# Patient Record
Sex: MALE | Race: WHITE
[De-identification: names, ages, dates, MRNs, and addresses within clinical notes are randomized per-mention and may not be internally consistent; named-entity substitution may affect disease eponyms.]

---

## 2018-06-14 ENCOUNTER — HOSPITAL ENCOUNTER (OUTPATIENT)
Dept: HOSPITAL 4 - SLB | Age: 38
Discharge: HOME | End: 2018-06-14
Attending: NEUROLOGICAL SURGERY
Payer: COMMERCIAL

## 2018-06-14 DIAGNOSIS — Z79.01: ICD-10-CM

## 2018-06-14 DIAGNOSIS — E10.649: Primary | ICD-10-CM

## 2018-06-14 LAB
ANION GAP SERPL CALCULATED.3IONS-SCNC: 7 MMOL/L (ref 5–15)
BASOPHILS # BLD AUTO: 0.1 K/UL (ref 0–0.2)
BASOPHILS NFR BLD AUTO: 1 % (ref 0–2)
BUN SERPL-MCNC: 9 MG/DL (ref 8–21)
CALCIUM SERPL-MCNC: 9.2 MG/DL (ref 8.4–11)
CHLORIDE SERPL-SCNC: 102 MMOL/L (ref 98–107)
CREAT SERPL-MCNC: 0.71 MG/DL (ref 0.55–1.3)
EOSINOPHIL # BLD AUTO: 0.4 K/UL (ref 0–0.4)
EOSINOPHIL NFR BLD AUTO: 4.3 % (ref 0–4)
ERYTHROCYTE [DISTWIDTH] IN BLOOD BY AUTOMATED COUNT: 13.4 % (ref 9–15)
GFR SERPL CREATININE-BSD FRML MDRD: 160 ML/MIN (ref 90–?)
GLUCOSE SERPL-MCNC: 146 MG/DL (ref 70–99)
HCT VFR BLD AUTO: 45.4 % (ref 36–54)
HGB BLD-MCNC: 15.2 G/DL (ref 14–18)
INR PPP: 1 (ref 0.8–1.2)
LYMPHOCYTES # BLD AUTO: 2.2 K/UL (ref 1–5.5)
LYMPHOCYTES NFR BLD AUTO: 24.6 % (ref 20.5–51.5)
MCH RBC QN AUTO: 33 PG (ref 27–31)
MCHC RBC AUTO-ENTMCNC: 34 % (ref 32–36)
MCV RBC AUTO: 98 FL (ref 79–98)
MONOCYTES # BLD MANUAL: 0.6 K/UL (ref 0–1)
MONOCYTES # BLD MANUAL: 7 % (ref 1.7–9.3)
NEUTROPHILS # BLD AUTO: 5.6 K/UL (ref 1.8–7.7)
NEUTROPHILS NFR BLD AUTO: 63.1 % (ref 40–70)
PLATELET # BLD AUTO: 377 K/UL (ref 130–430)
POTASSIUM SERPL-SCNC: 4.5 MMOL/L (ref 3.5–5.1)
PROTHROMBIN TIME: 9.7 SECS (ref 9.5–12.5)
RBC # BLD AUTO: 4.64 MIL/UL (ref 4.2–6.2)
SODIUM SERPLBLD-SCNC: 136 MMOL/L (ref 136–145)
WBC # BLD AUTO: 8.9 K/UL (ref 4.8–10.8)

## 2018-09-05 ENCOUNTER — HOSPITAL ENCOUNTER (OUTPATIENT)
Dept: HOSPITAL 4 - SRD | Age: 38
Discharge: HOME | End: 2018-09-05
Attending: INTERNAL MEDICINE
Payer: COMMERCIAL

## 2018-09-05 DIAGNOSIS — M19.90: ICD-10-CM

## 2018-09-05 DIAGNOSIS — I10: ICD-10-CM

## 2018-09-05 DIAGNOSIS — E56.9: ICD-10-CM

## 2018-09-05 DIAGNOSIS — G99.0: ICD-10-CM

## 2018-09-05 DIAGNOSIS — F17.200: ICD-10-CM

## 2018-09-05 DIAGNOSIS — E87.5: Primary | ICD-10-CM

## 2018-09-05 LAB
ALBUMIN SERPL BCP-MCNC: 4 G/DL (ref 3.4–4.8)
ALT SERPL W P-5'-P-CCNC: 22 U/L (ref 12–78)
ANION GAP SERPL CALCULATED.3IONS-SCNC: 7 MMOL/L (ref 5–15)
AST SERPL W P-5'-P-CCNC: 9 U/L (ref 10–37)
BASOPHILS # BLD AUTO: 0.1 K/UL (ref 0–0.2)
BASOPHILS NFR BLD AUTO: 0.7 % (ref 0–2)
BILIRUB SERPL-MCNC: 0.6 MG/DL (ref 0–1)
BUN SERPL-MCNC: 6 MG/DL (ref 8–21)
CALCIUM SERPL-MCNC: 9.2 MG/DL (ref 8.4–11)
CHLORIDE SERPL-SCNC: 102 MMOL/L (ref 98–107)
CHOLEST SERPL-MCNC: 229 MG/DL (ref ?–200)
CREAT SERPL-MCNC: 0.67 MG/DL (ref 0.55–1.3)
EOSINOPHIL # BLD AUTO: 0.4 K/UL (ref 0–0.4)
EOSINOPHIL NFR BLD AUTO: 4 % (ref 0–4)
ERYTHROCYTE [DISTWIDTH] IN BLOOD BY AUTOMATED COUNT: 12.5 % (ref 9–15)
GFR SERPL CREATININE-BSD FRML MDRD: 171 ML/MIN (ref 90–?)
GLUCOSE SERPL-MCNC: 96 MG/DL (ref 70–99)
HCT VFR BLD AUTO: 45.5 % (ref 36–54)
HDLC SERPL-MCNC: 31 MG/DL (ref 45–?)
HGB BLD-MCNC: 15.5 G/DL (ref 14–18)
INR PPP: 0.9 (ref 0.8–1.2)
LDL CHOLESTEROL: 164 MG/DL (ref ?–100)
LYMPHOCYTES # BLD AUTO: 2.8 K/UL (ref 1–5.5)
LYMPHOCYTES NFR BLD AUTO: 28.7 % (ref 20.5–51.5)
MCH RBC QN AUTO: 32 PG (ref 27–31)
MCHC RBC AUTO-ENTMCNC: 34 % (ref 32–36)
MCV RBC AUTO: 93 FL (ref 79–98)
MONOCYTES # BLD MANUAL: 0.6 K/UL (ref 0–1)
MONOCYTES # BLD MANUAL: 6.7 % (ref 1.7–9.3)
NEUTROPHILS # BLD AUTO: 5.8 K/UL (ref 1.8–7.7)
NEUTROPHILS NFR BLD AUTO: 59.9 % (ref 40–70)
PLATELET # BLD AUTO: 296 K/UL (ref 130–430)
POTASSIUM SERPL-SCNC: 4.3 MMOL/L (ref 3.5–5.1)
PROTHROMBIN TIME: 9.5 SECS (ref 9.5–12.5)
RBC # BLD AUTO: 4.89 MIL/UL (ref 4.2–6.2)
SODIUM SERPLBLD-SCNC: 138 MMOL/L (ref 136–145)
TRIGL SERPL-MCNC: 203 MG/DL (ref 30–150)
TSH SERPL DL<=0.05 MIU/L-ACNC: 1.84 UIU/ML (ref 0.34–4.82)
WBC # BLD AUTO: 9.7 K/UL (ref 4.8–10.8)

## 2018-09-06 LAB
HBA1C MFR BLD: 5.3 % (ref 4.8–5.6)
VIT B12 SERPL-MCNC: 235 PG/ML (ref 232–1245)

## 2018-10-05 ENCOUNTER — HOSPITAL ENCOUNTER (OUTPATIENT)
Dept: HOSPITAL 4 - SRD | Age: 38
Discharge: HOME | End: 2018-10-05
Attending: NEUROLOGICAL SURGERY
Payer: COMMERCIAL

## 2018-10-05 DIAGNOSIS — Z96.698: ICD-10-CM

## 2018-10-05 DIAGNOSIS — M54.5: Primary | ICD-10-CM

## 2019-03-13 ENCOUNTER — HOSPITAL ENCOUNTER (OUTPATIENT)
Dept: HOSPITAL 4 - SCT | Age: 39
Discharge: HOME | End: 2019-03-13
Attending: INTERNAL MEDICINE
Payer: COMMERCIAL

## 2019-03-13 DIAGNOSIS — M25.571: Primary | ICD-10-CM

## 2019-03-13 DIAGNOSIS — M25.572: ICD-10-CM

## 2019-04-02 ENCOUNTER — HOSPITAL ENCOUNTER (OUTPATIENT)
Dept: HOSPITAL 4 - SRD | Age: 39
Discharge: HOME | End: 2019-04-02
Attending: INTERNAL MEDICINE
Payer: COMMERCIAL

## 2019-04-02 DIAGNOSIS — M25.572: Primary | ICD-10-CM

## 2019-10-07 ENCOUNTER — HOSPITAL ENCOUNTER (EMERGENCY)
Dept: HOSPITAL 4 - SED | Age: 39
Discharge: HOME | End: 2019-10-07
Payer: COMMERCIAL

## 2019-10-07 VITALS — SYSTOLIC BLOOD PRESSURE: 150 MMHG | BODY MASS INDEX: 28.68 KG/M2 | HEIGHT: 64 IN | WEIGHT: 168 LBS

## 2019-10-07 VITALS — SYSTOLIC BLOOD PRESSURE: 140 MMHG

## 2019-10-07 DIAGNOSIS — Z71.6: ICD-10-CM

## 2019-10-07 DIAGNOSIS — F10.129: Primary | ICD-10-CM

## 2019-10-07 DIAGNOSIS — Z79.899: ICD-10-CM

## 2019-10-07 DIAGNOSIS — I10: ICD-10-CM

## 2019-10-07 DIAGNOSIS — F17.200: ICD-10-CM

## 2019-11-12 ENCOUNTER — HOSPITAL ENCOUNTER (EMERGENCY)
Dept: HOSPITAL 4 - SED | Age: 39
Discharge: LEFT BEFORE DISCHARGE | End: 2019-11-12
Payer: COMMERCIAL

## 2019-11-12 VITALS — BODY MASS INDEX: 27.2 KG/M2 | WEIGHT: 190 LBS | HEIGHT: 70 IN

## 2019-11-12 VITALS — SYSTOLIC BLOOD PRESSURE: 154 MMHG

## 2019-11-12 VITALS — SYSTOLIC BLOOD PRESSURE: 138 MMHG

## 2019-11-12 DIAGNOSIS — M79.671: ICD-10-CM

## 2019-11-12 DIAGNOSIS — M79.672: ICD-10-CM

## 2019-11-12 DIAGNOSIS — R74.0: ICD-10-CM

## 2019-11-12 DIAGNOSIS — F10.129: Primary | ICD-10-CM

## 2019-11-12 DIAGNOSIS — I10: ICD-10-CM

## 2019-11-12 DIAGNOSIS — G40.89: ICD-10-CM
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
## 2019-11-12 DIAGNOSIS — R73.9: ICD-10-CM

## 2019-11-12 LAB
ALBUMIN SERPL BCP-MCNC: 2.8 G/DL (ref 3.4–4.8)
ALT SERPL W P-5'-P-CCNC: 270 U/L (ref 12–78)
ANION GAP SERPL CALCULATED.3IONS-SCNC: 14 MMOL/L (ref 5–15)
AST SERPL W P-5'-P-CCNC: 137 U/L (ref 10–37)
BASOPHILS # BLD AUTO: 0 K/UL (ref 0–0.2)
BASOPHILS NFR BLD AUTO: 0.4 % (ref 0–2)
BILIRUB SERPL-MCNC: 0.8 MG/DL (ref 0–1)
BUN SERPL-MCNC: 20 MG/DL (ref 8–21)
CALCIUM SERPL-MCNC: 7 MG/DL (ref 8.4–11)
CHLORIDE SERPL-SCNC: 97 MMOL/L (ref 98–107)
CREAT SERPL-MCNC: 0.84 MG/DL (ref 0.55–1.3)
EOSINOPHIL # BLD AUTO: 0 K/UL (ref 0–0.4)
EOSINOPHIL NFR BLD AUTO: 0.2 % (ref 0–4)
ERYTHROCYTE [DISTWIDTH] IN BLOOD BY AUTOMATED COUNT: 15.6 % (ref 9–15)
ETHANOL SERPL-MCNC: 332 MG/DL (ref ?–10)
GFR SERPL CREATININE-BSD FRML MDRD: 131 ML/MIN (ref 90–?)
GLUCOSE SERPL-MCNC: 182 MG/DL (ref 70–99)
HCT VFR BLD AUTO: 49 % (ref 36–54)
HGB BLD-MCNC: 16.4 G/DL (ref 14–18)
LIPASE SERPL-CCNC: 229 U/L (ref 73–393)
LYMPHOCYTES # BLD AUTO: 4.1 K/UL (ref 1–5.5)
LYMPHOCYTES NFR BLD AUTO: 40.8 % (ref 20.5–51.5)
MCH RBC QN AUTO: 32 PG (ref 27–31)
MCHC RBC AUTO-ENTMCNC: 34 % (ref 32–36)
MCV RBC AUTO: 95 FL (ref 79–98)
MONOCYTES # BLD MANUAL: 0.6 K/UL (ref 0–1)
MONOCYTES # BLD MANUAL: 6.3 % (ref 1.7–9.3)
NEUTROPHILS # BLD AUTO: 5.3 K/UL (ref 1.8–7.7)
NEUTROPHILS NFR BLD AUTO: 52.3 % (ref 40–70)
PLATELET # BLD AUTO: 193 K/UL (ref 130–430)
POTASSIUM SERPL-SCNC: 4.7 MMOL/L (ref 3.5–5.1)
RBC # BLD AUTO: 5.16 MIL/UL (ref 4.2–6.2)
SODIUM SERPLBLD-SCNC: 133 MMOL/L (ref 136–145)
WBC # BLD AUTO: 10.1 K/UL (ref 4.8–10.8)

## 2019-11-12 PROCEDURE — 96365 THER/PROPH/DIAG IV INF INIT: CPT

## 2019-11-12 PROCEDURE — 99283 EMERGENCY DEPT VISIT LOW MDM: CPT

## 2019-11-12 PROCEDURE — 85025 COMPLETE CBC W/AUTO DIFF WBC: CPT

## 2019-11-12 PROCEDURE — 96375 TX/PRO/DX INJ NEW DRUG ADDON: CPT

## 2019-11-12 PROCEDURE — 80053 COMPREHEN METABOLIC PANEL: CPT

## 2019-11-12 PROCEDURE — 83690 ASSAY OF LIPASE: CPT

## 2019-11-12 PROCEDURE — 36415 COLL VENOUS BLD VENIPUNCTURE: CPT

## 2019-11-12 PROCEDURE — G0482 DRUG TEST DEF 15-21 CLASSES: HCPCS

## 2019-11-12 NOTE — NUR
IV FLUIDS INFUSING WELL, SAFETY PRECAUTIONS IN PLACE. PT STATES HES BEEN ON A 
DRINKNING BINGE FOR THE LAST SEVERAL MONTHS , DRINKING VODKA. REPORTS INVREASE 
IN STRESS. PT DENIES ANY SI/HI, NO FEVERS/CHILLS. DENIES ANY ABDOMINAL PAIN, 
RESP EVEN AND UNLABORED, ON RA @98%. SKIN W/D/I.

## 2019-11-12 NOTE — NUR
PT EMRE, DR BONILLA INFORMED, UMBERTO GUERRERO WAS CALLED AND INFORMED THAT PT LEFT 
WITH IV TO LEFT AC. SPOKE WITH GWENDOLYN PANDYA WHO I GAVE PT'S INFORMATION. ALL 
QUESTIONS ANSWERED.

## 2019-11-12 NOTE — NUR
CALLED CHELSEA SHELBY AND INFORMED THEM PT ELOPED FROM EMERGENCY ROOM BED 5 
WITH SL IN PLACE TO RT AC. DR BONILLA AND CN INFORMED. 

-------------------------------------------------------------------------------

Addendum: 11/12/19 at 1905 by [id number]

-------------------------------------------------------------------------------

NOTE ENTEREED BY GELACIO TORRES RN

## 2019-11-14 ENCOUNTER — HOSPITAL ENCOUNTER (INPATIENT)
Dept: HOSPITAL 4 - STU | Age: 39
LOS: 2 days | Discharge: HOME | DRG: 433 | End: 2019-11-16
Attending: INTERNAL MEDICINE | Admitting: INTERNAL MEDICINE
Payer: COMMERCIAL

## 2019-11-14 VITALS — SYSTOLIC BLOOD PRESSURE: 159 MMHG

## 2019-11-14 VITALS — SYSTOLIC BLOOD PRESSURE: 147 MMHG

## 2019-11-14 VITALS — SYSTOLIC BLOOD PRESSURE: 157 MMHG

## 2019-11-14 VITALS — SYSTOLIC BLOOD PRESSURE: 162 MMHG

## 2019-11-14 VITALS — WEIGHT: 190 LBS | BODY MASS INDEX: 27.2 KG/M2 | HEIGHT: 70 IN

## 2019-11-14 DIAGNOSIS — E83.39: ICD-10-CM

## 2019-11-14 DIAGNOSIS — I10: ICD-10-CM

## 2019-11-14 DIAGNOSIS — M19.172: ICD-10-CM

## 2019-11-14 DIAGNOSIS — F32.9: ICD-10-CM

## 2019-11-14 DIAGNOSIS — M19.171: ICD-10-CM

## 2019-11-14 DIAGNOSIS — G89.4: ICD-10-CM

## 2019-11-14 DIAGNOSIS — Z79.899: ICD-10-CM

## 2019-11-14 DIAGNOSIS — K70.10: Primary | ICD-10-CM

## 2019-11-14 DIAGNOSIS — E83.51: ICD-10-CM

## 2019-11-14 DIAGNOSIS — E87.1: ICD-10-CM

## 2019-11-14 DIAGNOSIS — E44.0: ICD-10-CM

## 2019-11-14 DIAGNOSIS — F10.239: ICD-10-CM

## 2019-11-14 LAB
ALBUMIN SERPL BCP-MCNC: 3.1 G/DL (ref 3.4–4.8)
ALT SERPL W P-5'-P-CCNC: 319 U/L (ref 12–78)
ANION GAP SERPL CALCULATED.3IONS-SCNC: 12 MMOL/L (ref 5–15)
APPEARANCE UR: CLEAR
AST SERPL W P-5'-P-CCNC: 334 U/L (ref 10–37)
BASOPHILS # BLD AUTO: 0.1 K/UL (ref 0–0.2)
BASOPHILS NFR BLD AUTO: 0.5 % (ref 0–2)
BILIRUB SERPL-MCNC: 1.2 MG/DL (ref 0–1)
BILIRUB UR QL STRIP: NEGATIVE
BUN SERPL-MCNC: 19 MG/DL (ref 8–21)
CALCIUM SERPL-MCNC: 7.1 MG/DL (ref 8.4–11)
CHLORIDE SERPL-SCNC: 94 MMOL/L (ref 98–107)
COLOR UR: YELLOW
CREAT SERPL-MCNC: 0.48 MG/DL (ref 0.55–1.3)
EOSINOPHIL # BLD AUTO: 0 K/UL (ref 0–0.4)
EOSINOPHIL NFR BLD AUTO: 0.3 % (ref 0–4)
ERYTHROCYTE [DISTWIDTH] IN BLOOD BY AUTOMATED COUNT: 15 % (ref 9–15)
GFR SERPL CREATININE-BSD FRML MDRD: 250 ML/MIN (ref 90–?)
GLUCOSE SERPL-MCNC: 150 MG/DL (ref 70–99)
GLUCOSE UR STRIP-MCNC: NEGATIVE MG/DL
HCT VFR BLD AUTO: 51.4 % (ref 36–54)
HGB BLD-MCNC: 17.3 G/DL (ref 14–18)
HGB UR QL STRIP: (no result)
HYALINE CASTS URNS QL MICRO: (no result) /LPF
INR PPP: 0.9 (ref 0.8–1.2)
KETONES UR STRIP-MCNC: NEGATIVE MG/DL
LEUKOCYTE ESTERASE UR QL STRIP: NEGATIVE
LYMPHOCYTES # BLD AUTO: 4.1 K/UL (ref 1–5.5)
LYMPHOCYTES NFR BLD AUTO: 40.2 % (ref 20.5–51.5)
MCH RBC QN AUTO: 32 PG (ref 27–31)
MCHC RBC AUTO-ENTMCNC: 34 % (ref 32–36)
MCV RBC AUTO: 95 FL (ref 79–98)
MONOCYTES # BLD MANUAL: 0.5 K/UL (ref 0–1)
MONOCYTES # BLD MANUAL: 5.2 % (ref 1.7–9.3)
MUCOUS THREADS URNS QL MICRO: (no result) /LPF
NEUTROPHILS # BLD AUTO: 5.5 K/UL (ref 1.8–7.7)
NEUTROPHILS NFR BLD AUTO: 53.8 % (ref 40–70)
NITRITE UR QL STRIP: NEGATIVE
PH UR STRIP: 6 [PH] (ref 5–8)
PLATELET # BLD AUTO: 196 K/UL (ref 130–430)
POTASSIUM SERPL-SCNC: 3.6 MMOL/L (ref 3.5–5.1)
PROT UR QL STRIP: (no result)
PROTHROMBIN TIME: 9.3 SECS (ref 9.5–12.5)
RBC # BLD AUTO: 5.44 MIL/UL (ref 4.2–6.2)
RBC #/AREA URNS HPF: (no result) /HPF (ref 0–3)
SODIUM SERPLBLD-SCNC: 127 MMOL/L (ref 136–145)
SP GR UR STRIP: 1.02 (ref 1–1.03)
UROBILINOGEN UR STRIP-MCNC: 0.2 MG/DL (ref 0.2–1)
WBC # BLD AUTO: 10.3 K/UL (ref 4.8–10.8)
WBC #/AREA URNS HPF: (no result) /HPF (ref 0–3)

## 2019-11-14 PROCEDURE — G0378 HOSPITAL OBSERVATION PER HR: HCPCS

## 2019-11-14 RX ADMIN — GABAPENTIN SCH MG: 100 CAPSULE ORAL at 22:26

## 2019-11-14 RX ADMIN — CARVEDILOL SCH MG: 25 TABLET, FILM COATED ORAL at 20:51

## 2019-11-14 RX ADMIN — SODIUM CHLORIDE SCH MLS/HR: 9 INJECTION, SOLUTION INTRAVENOUS at 15:41

## 2019-11-14 RX ADMIN — GABAPENTIN SCH MG: 100 CAPSULE ORAL at 16:49

## 2019-11-14 RX ADMIN — HYDROCODONE BITARTRATE AND ACETAMINOPHEN PRN TAB: 10; 325 TABLET ORAL at 16:51

## 2019-11-14 RX ADMIN — HYDROCODONE BITARTRATE AND ACETAMINOPHEN PRN TAB: 10; 325 TABLET ORAL at 23:29

## 2019-11-14 RX ADMIN — HYDROCODONE BITARTRATE AND ACETAMINOPHEN PRN TAB: 10; 325 TABLET ORAL at 22:25

## 2019-11-14 RX ADMIN — DEXTROSE SCH MLS/HR: 50 INJECTION, SOLUTION INTRAVENOUS at 15:38

## 2019-11-15 VITALS — SYSTOLIC BLOOD PRESSURE: 170 MMHG

## 2019-11-15 VITALS — SYSTOLIC BLOOD PRESSURE: 196 MMHG

## 2019-11-15 VITALS — SYSTOLIC BLOOD PRESSURE: 169 MMHG

## 2019-11-15 VITALS — SYSTOLIC BLOOD PRESSURE: 164 MMHG

## 2019-11-15 VITALS — SYSTOLIC BLOOD PRESSURE: 161 MMHG

## 2019-11-15 LAB
ALBUMIN SERPL BCP-MCNC: 2.3 G/DL (ref 3.4–4.8)
ALT SERPL W P-5'-P-CCNC: 263 U/L (ref 12–78)
ANION GAP SERPL CALCULATED.3IONS-SCNC: 5 MMOL/L (ref 5–15)
AST SERPL W P-5'-P-CCNC: 302 U/L (ref 10–37)
BASOPHILS NFR BLD MANUAL: 0 % (ref 0–2)
BILIRUB SERPL-MCNC: 1.5 MG/DL (ref 0–1)
BUN SERPL-MCNC: 11 MG/DL (ref 8–21)
CALCIUM SERPL-MCNC: 7.2 MG/DL (ref 8.4–11)
CHLORIDE SERPL-SCNC: 98 MMOL/L (ref 98–107)
CHOLEST SERPL-MCNC: 300 MG/DL (ref ?–200)
CREAT SERPL-MCNC: 0.75 MG/DL (ref 0.55–1.3)
EOSINOPHIL NFR BLD MANUAL: 0 % (ref 0–7)
ERYTHROCYTE [DISTWIDTH] IN BLOOD BY AUTOMATED COUNT: 15 % (ref 9–15)
GFR SERPL CREATININE-BSD FRML MDRD: 149 ML/MIN (ref 90–?)
GLUCOSE SERPL-MCNC: 134 MG/DL (ref 70–99)
HCT VFR BLD AUTO: 39.5 % (ref 36–54)
HDLC SERPL-MCNC: 11 MG/DL (ref 45–?)
HGB BLD-MCNC: 13.3 G/DL (ref 14–18)
LDL CHOLESTEROL: 53 MG/DL (ref ?–100)
LIPASE SERPL-CCNC: 411 U/L (ref 73–393)
LYMPHOCYTES NFR BLD MANUAL: 42 % (ref 20–46)
MCH RBC QN AUTO: 32 PG (ref 27–31)
MCHC RBC AUTO-ENTMCNC: 34 % (ref 32–36)
MCV RBC AUTO: 95 FL (ref 79–98)
MONOCYTES # BLD MANUAL: 3 % (ref 0–11)
NEUTS BAND NFR BLD MANUAL: 2 % (ref 0–6)
PHOSPHATE SERPL-MCNC: 1.7 MG/DL (ref 2.7–4.5)
PLATELET # BLD AUTO: 96 K/UL (ref 130–430)
POTASSIUM SERPL-SCNC: 3.7 MMOL/L (ref 3.5–5.1)
RBC # BLD AUTO: 4.17 MIL/UL (ref 4.2–6.2)
SODIUM SERPLBLD-SCNC: 126 MMOL/L (ref 136–145)
T4 FREE SERPL-MCNC: 0.9 NG/DL (ref 0.8–1.5)
WBC # BLD AUTO: 7.1 K/UL (ref 4.8–10.8)

## 2019-11-15 RX ADMIN — GABAPENTIN SCH MG: 100 CAPSULE ORAL at 11:09

## 2019-11-15 RX ADMIN — GABAPENTIN SCH MG: 100 CAPSULE ORAL at 20:59

## 2019-11-15 RX ADMIN — HYDROCODONE BITARTRATE AND ACETAMINOPHEN PRN TAB: 10; 325 TABLET ORAL at 16:42

## 2019-11-15 RX ADMIN — HYDROCODONE BITARTRATE AND ACETAMINOPHEN PRN TAB: 10; 325 TABLET ORAL at 21:37

## 2019-11-15 RX ADMIN — FAMOTIDINE SCH MG: 20 TABLET, FILM COATED ORAL at 09:33

## 2019-11-15 RX ADMIN — GABAPENTIN SCH MG: 100 CAPSULE ORAL at 17:00

## 2019-11-15 RX ADMIN — HYDROCODONE BITARTRATE AND ACETAMINOPHEN PRN TAB: 10; 325 TABLET ORAL at 21:10

## 2019-11-15 RX ADMIN — DEXTROSE SCH MLS/HR: 50 INJECTION, SOLUTION INTRAVENOUS at 12:27

## 2019-11-15 RX ADMIN — HYDROCODONE BITARTRATE AND ACETAMINOPHEN PRN TAB: 10; 325 TABLET ORAL at 05:35

## 2019-11-15 RX ADMIN — Medication SCH MG: at 09:34

## 2019-11-15 RX ADMIN — GABAPENTIN SCH MG: 100 CAPSULE ORAL at 15:07

## 2019-11-15 RX ADMIN — HYDROCODONE BITARTRATE AND ACETAMINOPHEN PRN TAB: 10; 325 TABLET ORAL at 21:41

## 2019-11-15 RX ADMIN — FAMOTIDINE SCH MG: 20 TABLET, FILM COATED ORAL at 21:02

## 2019-11-15 RX ADMIN — Medication SCH MG: at 09:33

## 2019-11-15 RX ADMIN — CARVEDILOL SCH MG: 25 TABLET, FILM COATED ORAL at 09:34

## 2019-11-15 RX ADMIN — CARVEDILOL SCH MG: 25 TABLET, FILM COATED ORAL at 21:03

## 2019-11-15 RX ADMIN — SODIUM CHLORIDE SCH MLS/HR: 9 INJECTION, SOLUTION INTRAVENOUS at 11:11

## 2019-11-15 RX ADMIN — HYDROCODONE BITARTRATE AND ACETAMINOPHEN PRN TAB: 10; 325 TABLET ORAL at 21:39

## 2019-11-16 VITALS — SYSTOLIC BLOOD PRESSURE: 149 MMHG

## 2019-11-16 VITALS — SYSTOLIC BLOOD PRESSURE: 159 MMHG

## 2019-11-16 VITALS — SYSTOLIC BLOOD PRESSURE: 136 MMHG

## 2019-11-16 VITALS — SYSTOLIC BLOOD PRESSURE: 154 MMHG

## 2019-11-16 LAB
ALBUMIN SERPL BCP-MCNC: 2.4 G/DL (ref 3.4–4.8)
ALT SERPL W P-5'-P-CCNC: 199 U/L (ref 12–78)
ANION GAP SERPL CALCULATED.3IONS-SCNC: 10 MMOL/L (ref 5–15)
AST SERPL W P-5'-P-CCNC: 161 U/L (ref 10–37)
BASOPHILS # BLD AUTO: 0.1 K/UL (ref 0–0.2)
BASOPHILS NFR BLD AUTO: 0.5 % (ref 0–2)
BILIRUB SERPL-MCNC: 1.5 MG/DL (ref 0–1)
BUN SERPL-MCNC: 8 MG/DL (ref 8–21)
CALCIUM SERPL-MCNC: 7.9 MG/DL (ref 8.4–11)
CHLORIDE SERPL-SCNC: 99 MMOL/L (ref 98–107)
CREAT SERPL-MCNC: 0.53 MG/DL (ref 0.55–1.3)
EOSINOPHIL # BLD AUTO: 0.1 K/UL (ref 0–0.4)
EOSINOPHIL NFR BLD AUTO: 0.7 % (ref 0–4)
ERYTHROCYTE [DISTWIDTH] IN BLOOD BY AUTOMATED COUNT: 14.9 % (ref 9–15)
GFR SERPL CREATININE-BSD FRML MDRD: 223 ML/MIN (ref 90–?)
GLUCOSE SERPL-MCNC: 106 MG/DL (ref 70–99)
HCT VFR BLD AUTO: 41.2 % (ref 36–54)
HGB BLD-MCNC: 14.2 G/DL (ref 14–18)
LIPASE SERPL-CCNC: 441 U/L (ref 73–393)
LYMPHOCYTES # BLD AUTO: 1.9 K/UL (ref 1–5.5)
LYMPHOCYTES NFR BLD AUTO: 18.1 % (ref 20.5–51.5)
MCH RBC QN AUTO: 33 PG (ref 27–31)
MCHC RBC AUTO-ENTMCNC: 35 % (ref 32–36)
MCV RBC AUTO: 95 FL (ref 79–98)
MONOCYTES # BLD MANUAL: 0.3 K/UL (ref 0–1)
MONOCYTES # BLD MANUAL: 2.9 % (ref 1.7–9.3)
NEUTROPHILS # BLD AUTO: 8.2 K/UL (ref 1.8–7.7)
NEUTROPHILS NFR BLD AUTO: 77.8 % (ref 40–70)
PHOSPHATE SERPL-MCNC: 3.6 MG/DL (ref 2.7–4.5)
PLATELET # BLD AUTO: 80 K/UL (ref 130–430)
POTASSIUM SERPL-SCNC: 3.8 MMOL/L (ref 3.5–5.1)
RBC # BLD AUTO: 4.34 MIL/UL (ref 4.2–6.2)
SODIUM SERPLBLD-SCNC: 131 MMOL/L (ref 136–145)
WBC # BLD AUTO: 10.5 K/UL (ref 4.8–10.8)

## 2019-11-16 RX ADMIN — Medication SCH MG: at 09:03

## 2019-11-16 RX ADMIN — FAMOTIDINE SCH MG: 20 TABLET, FILM COATED ORAL at 09:03

## 2019-11-16 RX ADMIN — CARVEDILOL SCH MG: 25 TABLET, FILM COATED ORAL at 09:11

## 2019-11-16 RX ADMIN — GABAPENTIN SCH MG: 100 CAPSULE ORAL at 09:02

## 2019-11-16 RX ADMIN — Medication SCH MG: at 09:02

## 2019-11-16 RX ADMIN — HYDROCODONE BITARTRATE AND ACETAMINOPHEN PRN TAB: 10; 325 TABLET ORAL at 03:48

## 2019-11-16 RX ADMIN — HYDROCODONE BITARTRATE AND ACETAMINOPHEN PRN TAB: 10; 325 TABLET ORAL at 06:38

## 2019-11-16 RX ADMIN — HYDROCODONE BITARTRATE AND ACETAMINOPHEN PRN TAB: 10; 325 TABLET ORAL at 01:18

## 2020-03-23 ENCOUNTER — HOSPITAL ENCOUNTER (OUTPATIENT)
Dept: HOSPITAL 4 - SLB | Age: 40
Discharge: HOME | End: 2020-03-23
Attending: INTERNAL MEDICINE
Payer: COMMERCIAL

## 2020-03-23 DIAGNOSIS — F10.21: ICD-10-CM

## 2020-03-23 DIAGNOSIS — R73.9: ICD-10-CM

## 2020-03-23 DIAGNOSIS — I10: Primary | ICD-10-CM

## 2020-03-23 DIAGNOSIS — G99.0: ICD-10-CM

## 2020-03-23 DIAGNOSIS — E55.9: ICD-10-CM

## 2020-03-23 DIAGNOSIS — E56.9: ICD-10-CM

## 2020-03-23 LAB
ALBUMIN SERPL BCP-MCNC: 3.7 G/DL (ref 3.4–4.8)
ALT SERPL W P-5'-P-CCNC: 33 U/L (ref 12–78)
ANION GAP SERPL CALCULATED.3IONS-SCNC: 7 MMOL/L (ref 5–15)
APPEARANCE UR: CLEAR
AST SERPL W P-5'-P-CCNC: 17 U/L (ref 10–37)
BASOPHILS # BLD AUTO: 0.1 K/UL (ref 0–0.2)
BASOPHILS NFR BLD AUTO: 0.9 % (ref 0–2)
BILIRUB SERPL-MCNC: 0.3 MG/DL (ref 0–1)
BILIRUB UR QL STRIP: NEGATIVE
BUN SERPL-MCNC: 11 MG/DL (ref 8–21)
CALCIUM SERPL-MCNC: 8.8 MG/DL (ref 8.4–11)
CHLORIDE SERPL-SCNC: 100 MMOL/L (ref 98–107)
CHOLEST SERPL-MCNC: 288 MG/DL (ref ?–200)
COLOR UR: YELLOW
CREAT SERPL-MCNC: 0.71 MG/DL (ref 0.55–1.3)
EOSINOPHIL # BLD AUTO: 0.2 K/UL (ref 0–0.4)
EOSINOPHIL NFR BLD AUTO: 2.4 % (ref 0–4)
ERYTHROCYTE [DISTWIDTH] IN BLOOD BY AUTOMATED COUNT: 14.2 % (ref 9–15)
GFR SERPL CREATININE-BSD FRML MDRD: 159 ML/MIN (ref 90–?)
GLUCOSE SERPL-MCNC: 83 MG/DL (ref 70–99)
GLUCOSE UR STRIP-MCNC: NEGATIVE MG/DL
HCT VFR BLD AUTO: 45.7 % (ref 36–54)
HDLC SERPL-MCNC: 39 MG/DL (ref 45–?)
HGB BLD-MCNC: 15.4 G/DL (ref 14–18)
HGB UR QL STRIP: NEGATIVE
KETONES UR STRIP-MCNC: NEGATIVE MG/DL
LDL CHOLESTEROL: 187 MG/DL (ref ?–100)
LEUKOCYTE ESTERASE UR QL STRIP: NEGATIVE
LYMPHOCYTES # BLD AUTO: 3.2 K/UL (ref 1–5.5)
LYMPHOCYTES NFR BLD AUTO: 30.6 % (ref 20.5–51.5)
MCH RBC QN AUTO: 32 PG (ref 27–31)
MCHC RBC AUTO-ENTMCNC: 34 % (ref 32–36)
MCV RBC AUTO: 96 FL (ref 79–98)
MONOCYTES # BLD MANUAL: 0.9 K/UL (ref 0–1)
MONOCYTES # BLD MANUAL: 8.8 % (ref 1.7–9.3)
NEUTROPHILS # BLD AUTO: 6 K/UL (ref 1.8–7.7)
NEUTROPHILS NFR BLD AUTO: 57.3 % (ref 40–70)
NITRITE UR QL STRIP: NEGATIVE
PH UR STRIP: 6.5 [PH] (ref 5–8)
PLATELET # BLD AUTO: 226 K/UL (ref 130–430)
POTASSIUM SERPL-SCNC: 4.5 MMOL/L (ref 3.5–5.1)
PROT UR QL STRIP: NEGATIVE
RBC # BLD AUTO: 4.78 MIL/UL (ref 4.2–6.2)
SODIUM SERPLBLD-SCNC: 136 MMOL/L (ref 136–145)
SP GR UR STRIP: 1.01 (ref 1–1.03)
TRIGL SERPL-MCNC: 245 MG/DL (ref 30–150)
TSH SERPL DL<=0.05 MIU/L-ACNC: 3.03 UIU/ML (ref 0.36–3.74)
UROBILINOGEN UR STRIP-MCNC: 0.2 MG/DL (ref 0.2–1)
WBC # BLD AUTO: 10.4 K/UL (ref 4.8–10.8)

## 2020-03-24 LAB — VIT B12 SERPL-MCNC: 275 PG/ML (ref 232–1245)
